# Patient Record
(demographics unavailable — no encounter records)

---

## 2025-01-06 NOTE — CONSULT LETTER
[Dear  ___] : Dear  [unfilled], [Consult Letter:] : I had the pleasure of evaluating your patient, [unfilled]. [Please see my note below.] : Please see my note below. [Consult Closing:] : Thank you very much for allowing me to participate in the care of this patient.  If you have any questions, please do not hesitate to contact me. [Sincerely,] : Sincerely, [FreeTextEntry3] : Aimee Jimenez MD Division of Pediatric Gastroenterology [facility]

## 2025-01-06 NOTE — HISTORY OF PRESENT ILLNESS
[de-identified] : 18 year old female with Crohn's disease who is clinically well on her current regimen of infliximab rapid infusions now 900 mg every 8 weeks. Last infusion was Jan 2, 2025. No c/o abd pain. No N/V. No oral lesions. Cont with eczema which is predominantly behind her ears. BMs daily, formed without blood or diarrhea. LMP 2 weeks ago.  Initially presented with intermittent abdominal pain, fatigue, diarrhea, oral sores and poor weight gain. Colonoscopy Nov 30, 2016 EGD stomach with mod chronic and focally active gastritis, mod esophagitis and colonoscopy - with colitis and ileitis Dec 2018 EGD/colon unremarkable, path with histiocyte sugg of granuloma in TI Abd CT obtained at that time. ROS: eczema

## 2025-01-06 NOTE — PHYSICAL EXAM
[Well Developed] : well developed [NAD] : in no acute distress [PERRL] : pupils were equal, round, reactive to light  [icteric] : anicteric [Moist & Pink Mucous Membranes] : moist and pink mucous membranes [CTAB] : lungs clear to auscultation bilaterally [Respiratory Distress] : no respiratory distress  [Regular Rate and Rhythm] : regular rate and rhythm [Normal S1, S2] : normal S1 and S2 [Soft] : soft  [Distended] : non distended [Tender] : non tender [Normal Bowel Sounds] : normal bowel sounds [No HSM] : no hepatosplenomegaly appreciated [Normal Tone] : normal tone [Well-Perfused] : well-perfused [Edema] : no edema [Cyanosis] : no cyanosis [Eczema] : eczema [Jaundice] : no jaundice [Interactive] : interactive [de-identified] : eczema behind the ears

## 2025-01-06 NOTE — ASSESSMENT
[FreeTextEntry1] : 19 year old female with Crohn's disease who is clinically well on her current regimen of infliximab rapid infusions now 900 mg every 8 weeks.  Plan: cont current regimen aware of pot contribution from infliximab to eczema, however has had eczema prior to start of infliximab and now with clearing of eczema while still on infliximab Review labs from last infusion performed at infusion center on Jan 4, 2025 obtain fecal calprotectin continue to monitor disc flu vaccines disc college, infusions including biosimilars and infusion center as changing to [facility] colonoscopy to be scheduled as last performed in December 2018.

## 2025-07-21 NOTE — HISTORY OF PRESENT ILLNESS
[de-identified] : The patient is a 19 year old female who presents today complaining of RT SHOULDER pain. here with mom has crohns since age 10 on remicade- every 8 weeks in remission interested in pediatric medicine as career goes to [place] Date of Injury/Onset: Two months ago Pain at Rest: 4/10 Pain with Activity: 4/10 Mechanism of injury: Patient denies injury to the shoulder; pain began 2 months ago. Has pain when rotating the arm and putting her weight on it.  Quality of symptoms: Aching, constant no radicualr pain no paresthesias sleeping well  Improves with: Nothing Worse with: Weight-bearing Prior treatment/ Imaging: N/A Out of work: No School/Sport/Position/Occupation: MA at [employer] Additional Information: None Review of Systems: Constitutional: negative HEENT: negative CV: negative Pulm: negative GI: negative : negative Neuro: negative Skin: negative Endocrine: negative Heme: negative MSK: See HPI.

## 2025-07-21 NOTE — HISTORY OF PRESENT ILLNESS
[de-identified] : The patient is a 19 year old female who presents today complaining of RT SHOULDER pain. 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 Has pain when rotating the arm and putting her weight on it.  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

## 2025-07-21 NOTE — IMAGING
[de-identified] : Constitutional: Healthy, and well nourished in no acute distress. Psych: Calm, cooperative, grossly normal Eyes: Normal, sclera non-icteric Ears, Nose, Mouth, Throat: External inspection of nose and ears does not reveal any scars or masses Head: Normocephalic Neck: Neck appears supple without sign of limited or painful ROM Respiratory: Normal effort, no respiratory distress, no cyanosis Cardiovascular: Visualized extremities without edema or varicosities, warm, brisk cap refill Abdominal/GI: Not examined Skin: No rashes on the extremity examined.  Neurological: Patient is awake and alert   Shoulder RIGHT  :   Inspection:  Symmetric  Spurling test for C Spine: negative; No midline point tenderness Muscle Atrophy: Absent  Swelling: Absent   Palpation: Tenderness at mid trapezius in the upper back. mild tenderness anterior joint line  Direct palpation of AC Joint: Absent for tenderness Crepitus: NO    Masses: No palpable masses   Shoulder ROM Forward Flex:  180/180 deg ABDuction: 180/180 IR @side: T8 ER@side : 50  deg  Pain with ROM of the shoulder: Absent  Scapular dyskinesia during shoulder ROM: mild bilateral Scapular Winging during wall push up: Absent                Muscle	Strength or Pain with Strength Testing  Infraspinatus (ER at side): 5-/5  Supraspinatus (Leidy's): 4+/5  Subscapularis (belly press):  5-/5  Trapezius:  5-/5 ++ DISCOMFORT Biceps -curl:  5/5 -Speeds: 5/5  Triceps:  5/5  Deltoid:  5/5    Special Shoulder Tests:  Impingement: MILD SULLIVAN SETH  Moody's Test: neg Cross Body Adduction: well tolerated Apprehension:neg  Sulcus Sign: R 0.5 cm;  L 0.5 cm   ARM:   Inspection: Muscle Atrophy:absent              Palpation: Tenderness NO           Crepitus: NO  Masses : NO

## 2025-07-21 NOTE — IMAGING
[de-identified] : Constitutional: Healthy, and well nourished in no acute distress. Psych: Calm, cooperative, grossly normal Eyes: Normal, sclera non-icteric Ears, Nose, Mouth, Throat: External inspection of nose and ears does not reveal any scars or masses Head: Normocephalic Neck: Neck appears supple without sign of limited or painful ROM Respiratory: Normal effort, no respiratory distress, no cyanosis Cardiovascular: Visualized extremities without edema or varicosities, warm, brisk cap refill Abdominal/GI: Not examined Skin: No rashes on the extremity examined.  Neurological: Patient is awake and alert   Shoulder RIGHT  :   Inspection:  Symmetric  Spurling test for C Spine: negative; No midline point tenderness Muscle Atrophy: Absent  Swelling: Absent   Palpation: Tenderness at mid trapezius in the upper back. mild tenderness anterior joint line  Direct palpation of AC Joint: Absent for tenderness Crepitus: NO    Masses: No palpable masses   Shoulder ROM Forward Flex:  180/180 deg ABDuction: 180/180 IR @side: T8 ER@side : 50  deg  Pain with ROM of the shoulder: Absent  Scapular dyskinesia during shoulder ROM: mild bilateral Scapular Winging during wall push up: Absent                Muscle	Strength or Pain with Strength Testing  Infraspinatus (ER at side): 5-/5  Supraspinatus (Leidy's): 4+/5  Subscapularis (belly press):  5-/5  Trapezius:  5-/5 ++ DISCOMFORT Biceps -curl:  5/5 -Speeds: 5/5  Triceps:  5/5  Deltoid:  5/5    Special Shoulder Tests:  Impingement: MILD SULLIVAN SETH  [place]'s Test: neg Cross Body Adduction: well tolerated Apprehension:neg  Sulcus Sign: R 0.5 cm;  L 0.5 cm   ARM:   Inspection: Muscle Atrophy:absent              Palpation: Tenderness NO           Crepitus: NO  Masses : NO

## 2025-07-21 NOTE — DISCUSSION/SUMMARY
[de-identified] : The patient and their family member(s) were advised of the diagnosis. The natural history of the pathology was explained in full to the patient and the family in layman's terms.  right shoulder pain trapezius strain(mid strain and spasm)- over-recruitment global RTC inefficiency Here is the plan that we have set forth today. 1. comprehensive shoulder PT- deal with acute spasm and muscle ache, focus on lengthening and strengthening RTC and surrounding musculature and build in some neuromuscular re-education to get the muscles working well in concert with one another. 2. a HEP was provided and can be used with therabands in the interim 3. Can continue activity as tolerated. Should slow down or stop if pain increases or ones routine is difficult to perform. 4. see me back in about 3 weeks to reassess and check in on the initial progress with PT. The patient and the family understands the plan of care as described above.  All questions have been answered. Thank you for allowing me to care for Cary Sincerely, Ernestina Hillman, DO, FAAP, CAQ[facility]

## 2025-07-21 NOTE — DISCUSSION/SUMMARY
[de-identified] : The patient and their family member(s) were advised of the diagnosis. The natural history of the pathology was explained in full to the patient and the family in layman's terms.  right shoulder pain trapezius strain(mid strain and spasm)- over-recruitment global RTC inefficiency Here is the plan that we have set forth today. 1. comprehensive shoulder PT- deal with acute spasm and muscle ache, focus on lengthening and strengthening RTC and surrounding musculature and build in some neuromuscular re-education to get the muscles working well in concert with one another. 2. a HEP was provided and can be used with therabands in the interim 3. Can continue activity as tolerated. Should slow down or stop if pain increases or ones routine is difficult to perform. 4. see me back in about 3 weeks to reassess and check in on the initial progress with PT. The patient and the family understands the plan of care as described above.  All questions have been answered. Thank you for allowing me to care for Cary Sincerely, Ernestina Hillman, DO, FAAP, CAQ[facility]